# Patient Record
Sex: MALE | Race: WHITE | NOT HISPANIC OR LATINO | URBAN - METROPOLITAN AREA
[De-identification: names, ages, dates, MRNs, and addresses within clinical notes are randomized per-mention and may not be internally consistent; named-entity substitution may affect disease eponyms.]

---

## 2024-10-22 ENCOUNTER — OFFICE VISIT (OUTPATIENT)
Dept: URGENT CARE | Age: 24
End: 2024-10-22
Payer: OTHER GOVERNMENT

## 2024-10-22 VITALS
SYSTOLIC BLOOD PRESSURE: 144 MMHG | DIASTOLIC BLOOD PRESSURE: 80 MMHG | OXYGEN SATURATION: 98 % | RESPIRATION RATE: 18 BRPM | TEMPERATURE: 98.1 F | HEART RATE: 53 BPM

## 2024-10-22 DIAGNOSIS — L23.7 ALLERGIC CONTACT DERMATITIS DUE TO PLANTS, EXCEPT FOOD: Primary | ICD-10-CM

## 2024-10-22 PROCEDURE — 99203 OFFICE O/P NEW LOW 30 MIN: CPT

## 2024-10-22 RX ORDER — PREDNISONE 10 MG/1
TABLET ORAL
Qty: 30 TABLET | Refills: 0 | Status: SHIPPED | OUTPATIENT
Start: 2024-10-22 | End: 2024-11-01

## 2024-10-22 ASSESSMENT — ENCOUNTER SYMPTOMS
VOMITING: 0
COUGH: 0
WHEEZING: 0
DIARRHEA: 0
CHILLS: 0
FEVER: 0
NAUSEA: 0
SHORTNESS OF BREATH: 0

## 2024-10-22 NOTE — PROGRESS NOTES
Subjective   Patient ID: Cachorro Núñez is a 23 y.o. male. They present today with a chief complaint of Rash (Pt advised that he has had a rash on his extremities and chest for the past 6 days. Pt advised that the rash seems to come and go in spots. ).    History of Present Illness  Patient presents with rash on bilateral arms, chest, and left thigh. Claims red, itching, spreading rash for over a week. Patient explains that he believes he came in contact with poision ivy. Denies fever, chills, sweats. Denies n/v/d. Denies chest pain, sob. Denies wheezing, cough or other resp inovlvement.       Rash  Pertinent negatives include no cough, diarrhea, fever, shortness of breath or vomiting.       Past Medical History  Allergies as of 10/22/2024    (No Known Allergies)       (Not in a hospital admission)       No past medical history on file.    No past surgical history on file.         Review of Systems  Review of Systems   Constitutional:  Negative for chills and fever.   Respiratory:  Negative for cough, shortness of breath and wheezing.    Cardiovascular:  Negative for chest pain.   Gastrointestinal:  Negative for diarrhea, nausea and vomiting.   Skin:  Positive for rash.                                  Objective    Vitals:    10/22/24 1531   BP: 144/80   Pulse: 53   Resp: 18   Temp: 36.7 °C (98.1 °F)   SpO2: 98%     No LMP for male patient.    Physical Exam  Constitutional:       General: He is not in acute distress.     Appearance: He is not ill-appearing.   Pulmonary:      Effort: Pulmonary effort is normal. No respiratory distress.      Breath sounds: Normal breath sounds. No wheezing.   Skin:     General: Skin is warm.      Capillary Refill: Capillary refill takes less than 2 seconds.      Findings: Rash present. Rash is macular, papular and vesicular.      Comments: Erythematous rash with some vesicular lesions across lateral forearms b/l. No discharge or drainage. Non tender to touch. No induration or fluctuance  noted.    Neurological:      Mental Status: He is alert.         Procedures    Point of Care Test & Imaging Results from this visit  No results found for this visit on 10/22/24.   No results found.    Diagnostic study results (if any) were reviewed by Susannah Mckeon PA-C.    Assessment/Plan   Allergies, medications, history, and pertinent labs/EKGs/Imaging reviewed by Susannah Mckeon PA-C.     Medical Decision Making  MDM- History and examination consistent with contact dermatitis, due to exposure to poision ivy. No evidence of infection or sepsis. Plan is for taper of steroid medications, and symptomatic support at home. Return to clinic or present to ED if symptoms change or worsen. Otherwise follow up with PCP. Patient verbalized understanding and agrees with plan.       Orders and Diagnoses  Diagnoses and all orders for this visit:  Allergic contact dermatitis due to plants, except food  -     predniSONE (Deltasone) 10 mg tablet; Take 5 tablets (50 mg) by mouth once daily for 2 days, THEN 4 tablets (40 mg) once daily for 2 days, THEN 3 tablets (30 mg) once daily for 2 days, THEN 2 tablets (20 mg) once daily for 2 days, THEN 1 tablet (10 mg) once daily for 2 days.      Medical Admin Record      Patient disposition: Home    Electronically signed by Susannah Mckeon PA-C  3:39 PM